# Patient Record
Sex: MALE | Race: WHITE | NOT HISPANIC OR LATINO | Employment: FULL TIME | URBAN - METROPOLITAN AREA
[De-identification: names, ages, dates, MRNs, and addresses within clinical notes are randomized per-mention and may not be internally consistent; named-entity substitution may affect disease eponyms.]

---

## 2020-02-11 ENCOUNTER — CLINICAL SUPPORT (OUTPATIENT)
Dept: URGENT CARE | Facility: CLINIC | Age: 50
End: 2020-02-11

## 2020-02-11 PROCEDURE — 99499 COAST GUARD PHYSICAL: ICD-10-PCS | Mod: S$GLB,,, | Performed by: EMERGENCY MEDICINE

## 2020-02-11 PROCEDURE — 99499 UNLISTED E&M SERVICE: CPT | Mod: S$GLB,,, | Performed by: EMERGENCY MEDICINE

## 2020-02-11 PROCEDURE — 80306 PR DOT DRUG SCREENS: ICD-10-PCS | Mod: S$GLB,,, | Performed by: EMERGENCY MEDICINE

## 2020-02-11 PROCEDURE — 80306 DRUG TEST PRSMV INSTRMNT: CPT | Mod: S$GLB,,, | Performed by: EMERGENCY MEDICINE

## 2022-09-28 ENCOUNTER — TELEPHONE (OUTPATIENT)
Dept: FAMILY MEDICINE | Facility: CLINIC | Age: 52
End: 2022-09-28

## 2022-09-28 ENCOUNTER — OFFICE VISIT (OUTPATIENT)
Dept: FAMILY MEDICINE | Facility: CLINIC | Age: 52
End: 2022-09-28
Payer: COMMERCIAL

## 2022-09-28 VITALS
HEART RATE: 66 BPM | SYSTOLIC BLOOD PRESSURE: 108 MMHG | WEIGHT: 175 LBS | BODY MASS INDEX: 26.52 KG/M2 | HEIGHT: 68 IN | DIASTOLIC BLOOD PRESSURE: 72 MMHG

## 2022-09-28 DIAGNOSIS — Z12.11 SPECIAL SCREENING FOR MALIGNANT NEOPLASM OF COLON: ICD-10-CM

## 2022-09-28 DIAGNOSIS — M16.11 ARTHRITIS OF RIGHT HIP: ICD-10-CM

## 2022-09-28 DIAGNOSIS — Z00.00 WELLNESS EXAMINATION: Primary | ICD-10-CM

## 2022-09-28 PROCEDURE — 3078F PR MOST RECENT DIASTOLIC BLOOD PRESSURE < 80 MM HG: ICD-10-PCS | Mod: CPTII,S$GLB,, | Performed by: FAMILY MEDICINE

## 2022-09-28 PROCEDURE — 3074F SYST BP LT 130 MM HG: CPT | Mod: CPTII,S$GLB,, | Performed by: FAMILY MEDICINE

## 2022-09-28 PROCEDURE — 1159F PR MEDICATION LIST DOCUMENTED IN MEDICAL RECORD: ICD-10-PCS | Mod: CPTII,S$GLB,, | Performed by: FAMILY MEDICINE

## 2022-09-28 PROCEDURE — 3008F BODY MASS INDEX DOCD: CPT | Mod: CPTII,S$GLB,, | Performed by: FAMILY MEDICINE

## 2022-09-28 PROCEDURE — 99386 PR PREVENTIVE VISIT,NEW,40-64: ICD-10-PCS | Mod: S$GLB,,, | Performed by: FAMILY MEDICINE

## 2022-09-28 PROCEDURE — 1159F MED LIST DOCD IN RCRD: CPT | Mod: CPTII,S$GLB,, | Performed by: FAMILY MEDICINE

## 2022-09-28 PROCEDURE — 99386 PREV VISIT NEW AGE 40-64: CPT | Mod: S$GLB,,, | Performed by: FAMILY MEDICINE

## 2022-09-28 PROCEDURE — 3074F PR MOST RECENT SYSTOLIC BLOOD PRESSURE < 130 MM HG: ICD-10-PCS | Mod: CPTII,S$GLB,, | Performed by: FAMILY MEDICINE

## 2022-09-28 PROCEDURE — 3078F DIAST BP <80 MM HG: CPT | Mod: CPTII,S$GLB,, | Performed by: FAMILY MEDICINE

## 2022-09-28 PROCEDURE — 3008F PR BODY MASS INDEX (BMI) DOCUMENTED: ICD-10-PCS | Mod: CPTII,S$GLB,, | Performed by: FAMILY MEDICINE

## 2022-09-28 NOTE — TELEPHONE ENCOUNTER
----- Message from Kecia Simmons sent at 9/28/2022 10:32 AM CDT -----  Message goes to Lanette Gonzalez. The patient saw Dr. Vega today for a physical. Lanette Gonzalez has the form. The form needs to be signed by Dr. Vega,the lab results attached to the form, lab results written on the form. Please call when the form is ready. Scan in the chart. Pt's # [phone]

## 2022-09-28 NOTE — PROGRESS NOTES
SUBJECTIVE:    Patient ID: Manfred Diaz is a 52 y.o. male.    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  Stays active by going to the gym 4-5 times per week.  He can do cardio and light weightlifting for up to 1 hour.  No chest pain or shortness of breath.    Nonsmoker, rarely drinks alcohol.    Father had diabetes, mother has osteoarthritis and lumbar disc disease.    Had MRI of the right hip due to hip pain.  Was diagnosed with some mild arthritis and iliacus tear.  It bothers him only in certain positions and certain exercises.  On no medication for this.    He is here for wellness exam today.    Has never had a colonoscopy.      No visits with results within 6 Month(s) from this visit.   Latest known visit with results is:   Historical on 01/23/2008   Component Date Value Ref Range Status    Strep A Culture 01/23/2008 NEGATIVE FOR GROUP A STREP   Final       History reviewed. No pertinent past medical history.  Social History     Socioeconomic History    Marital status:    Tobacco Use    Smoking status: Never    Smokeless tobacco: Never   Substance and Sexual Activity    Alcohol use: Yes     Comment: occasionally     History reviewed. No pertinent surgical history.  Family History   Problem Relation Age of Onset    Diabetes Father        Review of patient's allergies indicates:  No Known Allergies  No current outpatient medications on file.    Review of Systems   Constitutional:  Negative for appetite change, chills, fatigue, fever and unexpected weight change.   HENT:  Negative for congestion, ear pain and trouble swallowing.    Eyes:  Negative for pain, discharge and visual disturbance.   Respiratory:  Negative for apnea, cough, shortness of breath and wheezing.    Cardiovascular:  Negative for chest pain and leg swelling.  "  Gastrointestinal:  Negative for abdominal pain, blood in stool, constipation, diarrhea, nausea and vomiting.   Endocrine: Negative for cold intolerance, heat intolerance and polydipsia.   Genitourinary:  Negative for dysuria, hematuria, testicular pain and urgency.   Musculoskeletal:  Positive for arthralgias (rt hip aches occas). Negative for gait problem, joint swelling and myalgias.   Neurological:  Negative for dizziness, seizures and numbness.   Psychiatric/Behavioral:  Negative for agitation, behavioral problems, hallucinations and sleep disturbance. The patient is not nervous/anxious.         Objective:      Vitals:    09/28/22 0927   BP: 108/72   Pulse: 66   Weight: 79.4 kg (175 lb)   Height: 5' 8" (1.727 m)     Physical Exam  Vitals and nursing note reviewed.   Constitutional:       General: He is not in acute distress.     Appearance: Normal appearance. He is well-developed and normal weight. He is not toxic-appearing.   HENT:      Head: Normocephalic and atraumatic.      Right Ear: Tympanic membrane and external ear normal.      Left Ear: Tympanic membrane and external ear normal.      Nose: Nose normal.   Eyes:      Pupils: Pupils are equal, round, and reactive to light.   Neck:      Thyroid: No thyromegaly.      Vascular: No carotid bruit.   Cardiovascular:      Rate and Rhythm: Normal rate and regular rhythm.      Heart sounds: Normal heart sounds. No murmur heard.  Pulmonary:      Effort: Pulmonary effort is normal.      Breath sounds: Normal breath sounds. No wheezing or rales.   Abdominal:      General: Bowel sounds are normal. There is no distension.      Palpations: Abdomen is soft.      Tenderness: There is no abdominal tenderness.   Musculoskeletal:         General: No tenderness or deformity. Normal range of motion.      Cervical back: Normal range of motion and neck supple.      Lumbar back: Normal. No spasms.      Comments: Bends 90 degrees at  waist, shoulders have good range of motion, " knees slightly crepitant but good range of motion, no pitting edema to lower extremities   Lymphadenopathy:      Cervical: No cervical adenopathy.   Skin:     General: Skin is warm and dry.      Findings: No rash.   Neurological:      Mental Status: He is alert and oriented to person, place, and time.      Cranial Nerves: No cranial nerve deficit.      Coordination: Coordination normal.   Psychiatric:         Mood and Affect: Mood normal.         Behavior: Behavior normal.         Thought Content: Thought content normal.         Judgment: Judgment normal.         Assessment:       1. Wellness examination    2. Special screening for malignant neoplasm of colon    3. Arthritis of right hip           Plan:       Wellness examination  -     Glucose, Fasting; Future; Expected date: 09/28/2022  -     Lipid Panel; Future; Expected date: 09/28/2022  [facility] wellness program lab work ordered, appears to have a healthy lifestyle and good exercise regimen.  Special screening for malignant neoplasm of colon  -     Ambulatory referral/consult to Gastroenterology; Future; Expected date: 10/05/2022  Will refer for colonoscopy Dr. Iron wadsworth  Arthritis of right hip  Minimal symptoms at this time.  Will get further lab work including CBC CMP PSA next visit.  Follow up in about 6 months (around 3/28/2023).        9/28/2022 Yefri Vega

## 2022-09-29 LAB
CHOLEST SERPL-MCNC: 173 MG/DL
CHOLEST/HDLC SERPL: 4.4 (CALC)
GLUCOSE SERPL-MCNC: 87 MG/DL (ref 65–99)
HDLC SERPL-MCNC: 39 MG/DL
LDLC SERPL CALC-MCNC: 112 MG/DL (CALC)
NONHDLC SERPL-MCNC: 134 MG/DL (CALC)
TRIGL SERPL-MCNC: 109 MG/DL

## 2022-10-05 ENCOUNTER — TELEPHONE (OUTPATIENT)
Dept: FAMILY MEDICINE | Facility: CLINIC | Age: 52
End: 2022-10-05

## 2022-10-05 NOTE — PROGRESS NOTES
Call patient.  Cholesterol excellent at 173 triglycerides 109.  Fasting sugar 87, all tests were normal.

## 2022-10-05 NOTE — TELEPHONE ENCOUNTER
----- Message from Yefri Vega MD sent at 10/4/2022  8:50 PM CDT -----  Call patient.  Cholesterol excellent at 173 triglycerides 109.  Fasting sugar 87, all tests were normal.

## 2023-01-27 ENCOUNTER — PATIENT MESSAGE (OUTPATIENT)
Dept: FAMILY MEDICINE | Facility: CLINIC | Age: 53
End: 2023-01-27

## 2023-03-24 ENCOUNTER — PATIENT MESSAGE (OUTPATIENT)
Dept: FAMILY MEDICINE | Facility: CLINIC | Age: 53
End: 2023-03-24

## 2023-03-24 ENCOUNTER — TELEPHONE (OUTPATIENT)
Dept: FAMILY MEDICINE | Facility: CLINIC | Age: 53
End: 2023-03-24

## 2023-03-24 DIAGNOSIS — Z12.5 SPECIAL SCREENING FOR MALIGNANT NEOPLASM OF PROSTATE: ICD-10-CM

## 2023-03-24 DIAGNOSIS — Z00.00 ROUTINE GENERAL MEDICAL EXAMINATION AT A HEALTH CARE FACILITY: Primary | ICD-10-CM

## 2023-03-27 ENCOUNTER — PATIENT MESSAGE (OUTPATIENT)
Dept: FAMILY MEDICINE | Facility: CLINIC | Age: 53
End: 2023-03-27

## 2023-03-28 ENCOUNTER — OFFICE VISIT (OUTPATIENT)
Dept: FAMILY MEDICINE | Facility: CLINIC | Age: 53
End: 2023-03-28
Payer: COMMERCIAL

## 2023-03-28 VITALS
WEIGHT: 180 LBS | HEART RATE: 60 BPM | HEIGHT: 68 IN | SYSTOLIC BLOOD PRESSURE: 106 MMHG | DIASTOLIC BLOOD PRESSURE: 70 MMHG | BODY MASS INDEX: 27.28 KG/M2

## 2023-03-28 DIAGNOSIS — Z85.828 HISTORY OF BASAL CELL CARCINOMA: ICD-10-CM

## 2023-03-28 DIAGNOSIS — Z85.89 HISTORY OF SQUAMOUS CELL CARCINOMA: ICD-10-CM

## 2023-03-28 DIAGNOSIS — Z00.00 WELLNESS EXAMINATION: Primary | ICD-10-CM

## 2023-03-28 PROCEDURE — 99214 PR OFFICE/OUTPT VISIT, EST, LEVL IV, 30-39 MIN: ICD-10-PCS | Mod: S$GLB,,, | Performed by: FAMILY MEDICINE

## 2023-03-28 PROCEDURE — 1159F MED LIST DOCD IN RCRD: CPT | Mod: CPTII,S$GLB,, | Performed by: FAMILY MEDICINE

## 2023-03-28 PROCEDURE — 3008F BODY MASS INDEX DOCD: CPT | Mod: CPTII,S$GLB,, | Performed by: FAMILY MEDICINE

## 2023-03-28 PROCEDURE — 3078F PR MOST RECENT DIASTOLIC BLOOD PRESSURE < 80 MM HG: ICD-10-PCS | Mod: CPTII,S$GLB,, | Performed by: FAMILY MEDICINE

## 2023-03-28 PROCEDURE — 99214 OFFICE O/P EST MOD 30 MIN: CPT | Mod: S$GLB,,, | Performed by: FAMILY MEDICINE

## 2023-03-28 PROCEDURE — 3078F DIAST BP <80 MM HG: CPT | Mod: CPTII,S$GLB,, | Performed by: FAMILY MEDICINE

## 2023-03-28 PROCEDURE — 3074F SYST BP LT 130 MM HG: CPT | Mod: CPTII,S$GLB,, | Performed by: FAMILY MEDICINE

## 2023-03-28 PROCEDURE — 1159F PR MEDICATION LIST DOCUMENTED IN MEDICAL RECORD: ICD-10-PCS | Mod: CPTII,S$GLB,, | Performed by: FAMILY MEDICINE

## 2023-03-28 PROCEDURE — 3008F PR BODY MASS INDEX (BMI) DOCUMENTED: ICD-10-PCS | Mod: CPTII,S$GLB,, | Performed by: FAMILY MEDICINE

## 2023-03-28 PROCEDURE — 3074F PR MOST RECENT SYSTOLIC BLOOD PRESSURE < 130 MM HG: ICD-10-PCS | Mod: CPTII,S$GLB,, | Performed by: FAMILY MEDICINE

## 2023-03-28 NOTE — PROGRESS NOTES
SUBJECTIVE:    Patient ID: Manfred Diaz is a 52 y.o. male.    Chief Complaint: Follow-up (No medicine, no complaints, abc )     Manfred Diaz is a 52 y.o. male.     Chief Complaint: Follow-up (No medicine, no complaints, abc )     51 yo M coming in for follow up in clinic today. He recently had Surgery with Dr. 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 Per pt there was full excision with good margins. He has plans to follow up with Dr. Hagen for dermatology in this coming April. He reports sun exposure often as a child. He is also an avid fisherman.     He remains active, exercising between 4-5 days a week. He does 30 minutes of cardio every other day with strength training and stretching as well. He was a PT prior to moving to management.      He is not interested in the Shingrex Vaccine at this time.      Pt had colonoscopy 3/10/23 with Dr. Perdue. RTC 10 yr.      No other symptoms or complaints at this time.      Review of Systems   Constitutional:  Negative for appetite change, chills, fatigue, fever and unexpected weight change.   HENT:  Negative for nasal congestion, ear pain and trouble swallowing.    Eyes:  Negative for pain, discharge and visual disturbance.   Respiratory:  Negative for apnea, cough, shortness of breath and wheezing.    Cardiovascular:  Negative for chest pain and leg swelling.   Gastrointestinal:  Negative for abdominal pain, blood in stool, constipation, diarrhea, nausea and vomiting.   Endocrine: Negative for cold intolerance, heat intolerance and polydipsia.   Genitourinary:  Negative for dysuria, hematuria, testicular pain and urgency.   Musculoskeletal:  Negative for gait problem, joint swelling and myalgias.   Neurological:  Negative for dizziness, seizures and numbness.   Psychiatric/Behavioral:  Negative for agitation, behavioral problems and hallucinations. The patient is not nervous/anxious.       Objective      Physical Exam  Vitals and  nursing note reviewed.   Constitutional:       Appearance: He is well-developed.   HENT:      Head: Normocephalic and atraumatic.      Right Ear: External ear normal.      Left Ear: External ear normal.      Nose: Nose normal.   Eyes:      Pupils: Pupils are equal, round, and reactive to light.   Neck:      Thyroid: No thyromegaly.      Vascular: No carotid bruit.   Cardiovascular:      Rate and Rhythm: Normal rate and regular rhythm.      Heart sounds: Normal heart sounds. No murmur heard.  Pulmonary:      Effort: Pulmonary effort is normal.      Breath sounds: Normal breath sounds. No wheezing or rales.   Abdominal:      General: Bowel sounds are normal. There is no distension.      Palpations: Abdomen is soft.      Tenderness: There is no abdominal tenderness.   Musculoskeletal:         General: No tenderness or deformity. Normal range of motion.      Cervical back: Normal range of motion and neck supple.      Lumbar back: Normal. No spasms.      Comments: Bends 90 degrees at  waist   Lymphadenopathy:      Cervical: No cervical adenopathy.   Skin:     General: Skin is warm and dry.      Findings: No rash.      Comments: Well healed incision on the L neck below the mandible. Dry and intact. No sign of infection. Well healed incision on the R cheek. Dry and intact. No sign of infection   Neurological:      Mental Status: He is alert and oriented to person, place, and time.      Cranial Nerves: No cranial nerve deficit.      Coordination: Coordination normal.   Psychiatric:         Behavior: Behavior normal.         Thought Content: Thought       No visits with results within 6 Month(s) from this visit.   Latest known visit with results is:   Office Visit on 09/28/2022   Component Date Value Ref Range Status    Cholesterol 09/28/2022 173  <200 mg/dL Final    HDL 09/28/2022 39 (L)  > OR = 40 mg/dL Final    Triglycerides 09/28/2022 109  <150 mg/dL Final    LDL Cholesterol 09/28/2022 112 (H)  mg/dL (calc) Final     "HDL/Cholesterol Ratio 09/28/2022 4.4  <5.0 (calc) Final    Non HDL Chol. (LDL+VLDL) 09/28/2022 134 (H)  <130 mg/dL (calc) Final    Glucose 09/28/2022 87  65 - 99 mg/dL Final       No past medical history on file.  Social History     Socioeconomic History    Marital status:    Tobacco Use    Smoking status: Never    Smokeless tobacco: Never   Substance and Sexual Activity    Alcohol use: Yes     Comment: occasionally     No past surgical history on file.  Family History   Problem Relation Age of Onset    Diabetes Father        Review of patient's allergies indicates:  No Known Allergies  No current outpatient medications on file.    Review of Systems        Objective:      Vitals:    03/28/23 1037   BP: 106/70   Pulse: 60   Weight: 81.6 kg (180 lb)   Height: 5' 8" (1.727 m)     Physical Exam      Assessment:       1. Wellness examination    2. History of squamous cell carcinoma    3. History of basal cell carcinoma         Plan:       Wellness examination  Patient has good exercise habits  History of squamous cell carcinoma  Continue follow-up with Dermatology, he may wish to transition to [facility] dermatologist soon.  History of basal cell carcinoma  Will need wellness labs done fasting this bring    No follow-ups on file.        3/28/2023 Yefri Vega      "

## 2023-03-28 NOTE — MEDICAL/APP STUDENT
Subjective     Patient ID: Manfred Diaz is a 52 y.o. male.    Chief Complaint: Follow-up (No medicine, no complaints, abc )    53 yo M coming in for follow up in clinic today. He recently had Surgery with Dr. 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 Per pt there was full excision with good margins. He has plans to follow up with Dr. Hagen for dermatology in this coming April. He reports sun exposure often as a child. He is also an avid fisherman.    He remains active, exercising between 4-5 days a week. He does 30 minutes of cardio every other day with strength training and stretching as well. He was a PT prior to moving to management.     He is not interested in the Shingrex Vaccine at this time.     Pt had colonoscopy 3/10/23 with Dr. Perdue. RTC 10 yr.     No other symptoms or complaints at this time.     Review of Systems   Constitutional:  Negative for appetite change, chills, fatigue, fever and unexpected weight change.   HENT:  Negative for nasal congestion, ear pain and trouble swallowing.    Eyes:  Negative for pain, discharge and visual disturbance.   Respiratory:  Negative for apnea, cough, shortness of breath and wheezing.    Cardiovascular:  Negative for chest pain and leg swelling.   Gastrointestinal:  Negative for abdominal pain, blood in stool, constipation, diarrhea, nausea and vomiting.   Endocrine: Negative for cold intolerance, heat intolerance and polydipsia.   Genitourinary:  Negative for dysuria, hematuria, testicular pain and urgency.   Musculoskeletal:  Negative for gait problem, joint swelling and myalgias.   Neurological:  Negative for dizziness, seizures and numbness.   Psychiatric/Behavioral:  Negative for agitation, behavioral problems and hallucinations. The patient is not nervous/anxious.         Objective     Physical Exam  Vitals and nursing note reviewed.   Constitutional:       Appearance: He is well-developed.   HENT:      Head: Normocephalic  and atraumatic.      Right Ear: External ear normal.      Left Ear: External ear normal.      Nose: Nose normal.   Eyes:      Pupils: Pupils are equal, round, and reactive to light.   Neck:      Thyroid: No thyromegaly.      Vascular: No carotid bruit.   Cardiovascular:      Rate and Rhythm: Normal rate and regular rhythm.      Heart sounds: Normal heart sounds. No murmur heard.  Pulmonary:      Effort: Pulmonary effort is normal.      Breath sounds: Normal breath sounds. No wheezing or rales.   Abdominal:      General: Bowel sounds are normal. There is no distension.      Palpations: Abdomen is soft.      Tenderness: There is no abdominal tenderness.   Musculoskeletal:         General: No tenderness or deformity. Normal range of motion.      Cervical back: Normal range of motion and neck supple.      Lumbar back: Normal. No spasms.      Comments: Bends 90 degrees at  waist   Lymphadenopathy:      Cervical: No cervical adenopathy.   Skin:     General: Skin is warm and dry.      Findings: No rash.      Comments: Well healed incision on the L neck below the mandible. Dry and intact. No sign of infection. Well healed incision on the R cheek. Dry and intact. No sign of infection   Neurological:      Mental Status: He is alert and oriented to person, place, and time.      Cranial Nerves: No cranial nerve deficit.      Coordination: Coordination normal.   Psychiatric:         Behavior: Behavior normal.         Thought Content: Thought content normal.         Judgment: Judgment normal.          Assessment and Plan     Problem List Items Addressed This Visit    None

## 2023-03-29 LAB
ALBUMIN SERPL-MCNC: 4.4 G/DL (ref 3.6–5.1)
ALBUMIN/GLOB SERPL: 1.8 (CALC) (ref 1–2.5)
ALP SERPL-CCNC: 77 U/L (ref 35–144)
ALT SERPL-CCNC: 68 U/L (ref 9–46)
APPEARANCE UR: CLEAR
AST SERPL-CCNC: 37 U/L (ref 10–35)
BACTERIA #/AREA URNS HPF: NORMAL /HPF
BACTERIA UR CULT: NORMAL
BASOPHILS # BLD AUTO: 19 CELLS/UL (ref 0–200)
BASOPHILS NFR BLD AUTO: 0.3 %
BILIRUB SERPL-MCNC: 0.7 MG/DL (ref 0.2–1.2)
BILIRUB UR QL STRIP: NEGATIVE
BUN SERPL-MCNC: 18 MG/DL (ref 7–25)
BUN/CREAT SERPL: ABNORMAL (CALC) (ref 6–22)
CALCIUM SERPL-MCNC: 9.4 MG/DL (ref 8.6–10.3)
CHLORIDE SERPL-SCNC: 106 MMOL/L (ref 98–110)
CHOLEST SERPL-MCNC: 173 MG/DL
CHOLEST/HDLC SERPL: 4.3 (CALC)
CO2 SERPL-SCNC: 29 MMOL/L (ref 20–32)
COLOR UR: YELLOW
CREAT SERPL-MCNC: 0.98 MG/DL (ref 0.7–1.3)
EGFR: 94 ML/MIN/1.73M2
EOSINOPHIL # BLD AUTO: 120 CELLS/UL (ref 15–500)
EOSINOPHIL NFR BLD AUTO: 1.9 %
ERYTHROCYTE [DISTWIDTH] IN BLOOD BY AUTOMATED COUNT: 12.6 % (ref 11–15)
GLOBULIN SER CALC-MCNC: 2.4 G/DL (CALC) (ref 1.9–3.7)
GLUCOSE SERPL-MCNC: 95 MG/DL (ref 65–99)
GLUCOSE UR QL STRIP: NEGATIVE
HCT VFR BLD AUTO: 44.5 % (ref 38.5–50)
HDLC SERPL-MCNC: 40 MG/DL
HGB BLD-MCNC: 15 G/DL (ref 13.2–17.1)
HGB UR QL STRIP: NEGATIVE
HYALINE CASTS #/AREA URNS LPF: NORMAL /LPF
KETONES UR QL STRIP: NEGATIVE
LDLC SERPL CALC-MCNC: 109 MG/DL (CALC)
LEUKOCYTE ESTERASE UR QL STRIP: NEGATIVE
LYMPHOCYTES # BLD AUTO: 1682 CELLS/UL (ref 850–3900)
LYMPHOCYTES NFR BLD AUTO: 26.7 %
MCH RBC QN AUTO: 29.6 PG (ref 27–33)
MCHC RBC AUTO-ENTMCNC: 33.7 G/DL (ref 32–36)
MCV RBC AUTO: 87.9 FL (ref 80–100)
MONOCYTES # BLD AUTO: 410 CELLS/UL (ref 200–950)
MONOCYTES NFR BLD AUTO: 6.5 %
NEUTROPHILS # BLD AUTO: 4070 CELLS/UL (ref 1500–7800)
NEUTROPHILS NFR BLD AUTO: 64.6 %
NITRITE UR QL STRIP: NEGATIVE
NONHDLC SERPL-MCNC: 133 MG/DL (CALC)
PH UR STRIP: 6.5 [PH] (ref 5–8)
PLATELET # BLD AUTO: 271 THOUSAND/UL (ref 140–400)
PMV BLD REES-ECKER: 9.9 FL (ref 7.5–12.5)
POTASSIUM SERPL-SCNC: 4.3 MMOL/L (ref 3.5–5.3)
PROT SERPL-MCNC: 6.8 G/DL (ref 6.1–8.1)
PROT UR QL STRIP: NEGATIVE
PSA SERPL-MCNC: 0.35 NG/ML
RBC # BLD AUTO: 5.06 MILLION/UL (ref 4.2–5.8)
RBC #/AREA URNS HPF: NORMAL /HPF
SERVICE CMNT-IMP: NORMAL
SODIUM SERPL-SCNC: 142 MMOL/L (ref 135–146)
SP GR UR STRIP: 1.01 (ref 1–1.03)
SQUAMOUS #/AREA URNS HPF: NORMAL /HPF
TRIGL SERPL-MCNC: 125 MG/DL
TSH SERPL-ACNC: 1.46 MIU/L (ref 0.4–4.5)
WBC # BLD AUTO: 6.3 THOUSAND/UL (ref 3.8–10.8)
WBC #/AREA URNS HPF: NORMAL /HPF

## 2023-04-01 ENCOUNTER — PATIENT MESSAGE (OUTPATIENT)
Dept: FAMILY MEDICINE | Facility: CLINIC | Age: 53
End: 2023-04-01

## 2023-04-01 NOTE — PROGRESS NOTES
Call patient.  He has mildly elevated liver enzymes AST 37 ALT 68.  Cholesterol excellent at 173 triglycerides 125.  Prostate normal with a PSA of 0.35 CBC shows no anemia.  Reduce any alcohol you may drink, also be careful with vitamins and supplements that may stress  the liver.  Repeat the CMP in 6 months to recheck liver enzymes

## 2023-04-03 ENCOUNTER — TELEPHONE (OUTPATIENT)
Dept: FAMILY MEDICINE | Facility: CLINIC | Age: 53
End: 2023-04-03

## 2023-04-03 NOTE — TELEPHONE ENCOUNTER
None of the over-the-counter supplements could be harmful for the liver long-term.  Possibly ivermectin and antibiotic.  Be advised that these liver numbers are not anything scary elevated.  Very mild elevations.  I agree with Dr. Vega that we recheck in about 6 months.  It is likely that they will have returned to normal.

## 2023-04-03 NOTE — TELEPHONE ENCOUNTER
----- Message from Yefri Vega MD sent at 4/1/2023 11:32 AM CDT -----  Call patient.  He has mildly elevated liver enzymes AST 37 ALT 68.  Cholesterol excellent at 173 triglycerides 125.  Prostate normal with a PSA of 0.35 CBC shows no anemia.  Reduce any alcohol you may drink, also be careful with vitamins and supplements that may stress  the liver.  Repeat the CMP in 6 months to recheck liver enzymes

## 2023-04-03 NOTE — TELEPHONE ENCOUNTER
----- Message from Alena Espinoza LPN sent at 4/3/2023  3:54 PM CDT -----  Regarding: FW: return call    ----- Message -----  From: Tarun Irby MA  Sent: 4/3/2023   3:53 PM CDT  To: Babita Mulligan Staff  Subject: return call                                      Pt returning amy's call regarding lab results. [phone]

## 2023-04-03 NOTE — TELEPHONE ENCOUNTER
Left message on voice mail for the pt to call back in regards to recent lab results.  Please see other telephone encounter for today.

## 2023-04-04 NOTE — TELEPHONE ENCOUNTER
Spoke with pt in regards to recent lab results. Verbalized per Dr. Vega that pt's mildly elevated liver enzymes AST 37 ALT 68. Cholesterol excellent at 173 triglycerides 125. Prostate normal with a PSA of 0.35 CBC shows no anemia. Reduce any alcohol you may drink, also be careful with vitamins and supplements that may stress  the liver. Repeat the CMP in 6 months to recheck liver enzymes.     Reminder set up.

## 2023-09-25 ENCOUNTER — TELEPHONE (OUTPATIENT)
Dept: FAMILY MEDICINE | Facility: CLINIC | Age: 53
End: 2023-09-25

## 2023-09-25 DIAGNOSIS — Z00.00 ROUTINE GENERAL MEDICAL EXAMINATION AT A HEALTH CARE FACILITY: Primary | ICD-10-CM

## 2023-09-25 DIAGNOSIS — Z79.899 ENCOUNTER FOR LONG-TERM (CURRENT) USE OF OTHER MEDICATIONS: ICD-10-CM

## 2023-09-25 NOTE — TELEPHONE ENCOUNTER
----- Message from Colette Casey sent at 9/25/2023  2:25 PM CDT -----  Pt is calling to reschedule his appt with dr. Vega as he has to go out of town for work. His is back on the 5th. He needs a lipid and a fasting glucose only for his work. He has a form. Has to have it done by the end of the year.   [phone]

## 2023-10-12 ENCOUNTER — OFFICE VISIT (OUTPATIENT)
Dept: FAMILY MEDICINE | Facility: CLINIC | Age: 53
End: 2023-10-12
Attending: FAMILY MEDICINE
Payer: COMMERCIAL

## 2023-10-12 VITALS
SYSTOLIC BLOOD PRESSURE: 110 MMHG | HEIGHT: 68 IN | HEART RATE: 60 BPM | BODY MASS INDEX: 27.13 KG/M2 | DIASTOLIC BLOOD PRESSURE: 80 MMHG | WEIGHT: 179 LBS

## 2023-10-12 DIAGNOSIS — Z00.00 WELLNESS EXAMINATION: Primary | ICD-10-CM

## 2023-10-12 DIAGNOSIS — Z85.828 HISTORY OF BASAL CELL CARCINOMA: ICD-10-CM

## 2023-10-12 DIAGNOSIS — R74.8 ELEVATED LIVER ENZYMES: ICD-10-CM

## 2023-10-12 DIAGNOSIS — Z85.89 HISTORY OF SQUAMOUS CELL CARCINOMA: ICD-10-CM

## 2023-10-12 PROCEDURE — 1159F MED LIST DOCD IN RCRD: CPT | Mod: CPTII,S$GLB,, | Performed by: FAMILY MEDICINE

## 2023-10-12 PROCEDURE — 3074F SYST BP LT 130 MM HG: CPT | Mod: CPTII,S$GLB,, | Performed by: FAMILY MEDICINE

## 2023-10-12 PROCEDURE — 3079F PR MOST RECENT DIASTOLIC BLOOD PRESSURE 80-89 MM HG: ICD-10-PCS | Mod: CPTII,S$GLB,, | Performed by: FAMILY MEDICINE

## 2023-10-12 PROCEDURE — 99396 PREV VISIT EST AGE 40-64: CPT | Mod: S$GLB,,, | Performed by: FAMILY MEDICINE

## 2023-10-12 PROCEDURE — 3079F DIAST BP 80-89 MM HG: CPT | Mod: CPTII,S$GLB,, | Performed by: FAMILY MEDICINE

## 2023-10-12 PROCEDURE — 3074F PR MOST RECENT SYSTOLIC BLOOD PRESSURE < 130 MM HG: ICD-10-PCS | Mod: CPTII,S$GLB,, | Performed by: FAMILY MEDICINE

## 2023-10-12 PROCEDURE — 1159F PR MEDICATION LIST DOCUMENTED IN MEDICAL RECORD: ICD-10-PCS | Mod: CPTII,S$GLB,, | Performed by: FAMILY MEDICINE

## 2023-10-12 PROCEDURE — 3008F PR BODY MASS INDEX (BMI) DOCUMENTED: ICD-10-PCS | Mod: CPTII,S$GLB,, | Performed by: FAMILY MEDICINE

## 2023-10-12 PROCEDURE — 99396 PR PREVENTIVE VISIT,EST,40-64: ICD-10-PCS | Mod: S$GLB,,, | Performed by: FAMILY MEDICINE

## 2023-10-12 PROCEDURE — 3008F BODY MASS INDEX DOCD: CPT | Mod: CPTII,S$GLB,, | Performed by: FAMILY MEDICINE

## 2023-10-12 NOTE — PROGRESS NOTES
SUBJECTIVE:    Patient ID: Manfred Diaz is a 53 y.o. male.    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  He enjoys exercising at the gym 3-4 days a week.  Weightlifting and small amount of cardio exercises.  No chest pain or shortness a breath with exercise.    He is a nonsmoker and occasionally drinks alcohol on the weekend.  He enjoys fishing in his spare time.    History of basal cell carcinoma and squamous cell carcinoma removed from his face and neck by [facility] surgeon Dr. Craven, dermatologist is Dr. Hagen.    2023-had colonoscopy with Dr. Perdue-RTC 10 years    Last year had mildly elevated liver enzymes        No visits with results within 6 Month(s) from this visit.   Latest known visit with results is:   Telephone on 03/24/2023   Component Date Value Ref Range Status    WBC 03/28/2023 6.3  3.8 - 10.8 Thousand/uL Final    RBC 03/28/2023 5.06  4.20 - 5.80 Million/uL Final    Hemoglobin 03/28/2023 15.0  13.2 - 17.1 g/dL Final    Hematocrit 03/28/2023 44.5  38.5 - 50.0 % Final    MCV 03/28/2023 87.9  80.0 - 100.0 fL Final    MCH 03/28/2023 29.6  27.0 - 33.0 pg Final    MCHC 03/28/2023 33.7  32.0 - 36.0 g/dL Final    RDW 03/28/2023 12.6  11.0 - 15.0 % Final    Platelets 03/28/2023 271  140 - 400 Thousand/uL Final    MPV 03/28/2023 9.9  7.5 - 12.5 fL Final    Neutrophils, Abs 03/28/2023 4,070  1,500 - 7,800 cells/uL Final    Lymph # 03/28/2023 1,682  850 - 3,900 cells/uL Final    Mono # 03/28/2023 410  200 - 950 cells/uL Final    Eos # 03/28/2023 120  15 - 500 cells/uL Final    Baso # 03/28/2023 19  0 - 200 cells/uL Final    Neutrophils Relative 03/28/2023 64.6  % Final    Lymph % 03/28/2023 26.7  % Final    Mono % 03/28/2023 6.5  % Final    Eosinophil % 03/28/2023 1.9  % Final    Basophil % 03/28/2023 0.3  % Final    Glucose 03/28/2023 95  65 - 99 mg/dL  Final    BUN 03/28/2023 18  7 - 25 mg/dL Final    Creatinine 03/28/2023 0.98  0.70 - 1.30 mg/dL Final    eGFR 03/28/2023 94  > OR = 60 mL/min/1.73m2 Final    BUN/Creatinine Ratio 03/28/2023 NOT APPLICABLE  6 - 22 (calc) Final    Sodium 03/28/2023 142  135 - 146 mmol/L Final    Potassium 03/28/2023 4.3  3.5 - 5.3 mmol/L Final    Chloride 03/28/2023 106  98 - 110 mmol/L Final    CO2 03/28/2023 29  20 - 32 mmol/L Final    Calcium 03/28/2023 9.4  8.6 - 10.3 mg/dL Final    Total Protein 03/28/2023 6.8  6.1 - 8.1 g/dL Final    Albumin 03/28/2023 4.4  3.6 - 5.1 g/dL Final    Globulin, Total 03/28/2023 2.4  1.9 - 3.7 g/dL (calc) Final    Albumin/Globulin Ratio 03/28/2023 1.8  1.0 - 2.5 (calc) Final    Total Bilirubin 03/28/2023 0.7  0.2 - 1.2 mg/dL Final    Alkaline Phosphatase 03/28/2023 77  35 - 144 U/L Final    AST 03/28/2023 37 (H)  10 - 35 U/L Final    ALT 03/28/2023 68 (H)  9 - 46 U/L Final    Cholesterol 03/28/2023 173  <200 mg/dL Final    HDL 03/28/2023 40  > OR = 40 mg/dL Final    Triglycerides 03/28/2023 125  <150 mg/dL Final    LDL Cholesterol 03/28/2023 109 (H)  mg/dL (calc) Final    HDL/Cholesterol Ratio 03/28/2023 4.3  <5.0 (calc) Final    Non HDL Chol. (LDL+VLDL) 03/28/2023 133 (H)  <130 mg/dL (calc) Final    Color, UA 03/28/2023 YELLOW  YELLOW Final    Appearance, UA 03/28/2023 CLEAR  CLEAR Final    Specific Gravity, UA 03/28/2023 1.015  1.001 - 1.035 Final    pH, UA 03/28/2023 6.5  5.0 - 8.0 Final    Glucose, UA 03/28/2023 NEGATIVE  NEGATIVE Final    Bilirubin, UA 03/28/2023 NEGATIVE  NEGATIVE Final    Ketones, UA 03/28/2023 NEGATIVE  NEGATIVE Final    Occult Blood UA 03/28/2023 NEGATIVE  NEGATIVE Final    Protein, UA 03/28/2023 NEGATIVE  NEGATIVE Final    Nitrite, UA 03/28/2023 NEGATIVE  NEGATIVE Final    Leukocytes, UA 03/28/2023 NEGATIVE  NEGATIVE Final    WBC Casts, UA 03/28/2023 NONE SEEN  < OR = 5 /HPF Final    RBC Casts, UA 03/28/2023 NONE SEEN  < OR = 2 /HPF Final    Squam Epithel, UA 03/28/2023  NONE SEEN  < OR = 5 /HPF Final    Bacteria, UA 03/28/2023 NONE SEEN  NONE SEEN /HPF Final    Hyaline Casts, UA 03/28/2023 NONE SEEN  NONE SEEN /LPF Final    Service Cmt: 03/28/2023    Final    Reflexive Urine Culture 03/28/2023    Final    TSH w/reflex to FT4 03/28/2023 1.46  0.40 - 4.50 mIU/L Final    PROSTATE SPECIFIC ANTIGEN, SCR - Q* 03/28/2023 0.35  < OR = 4.00 ng/mL Final       No past medical history on file.  Social History     Socioeconomic History    Marital status:    Tobacco Use    Smoking status: Never    Smokeless tobacco: Never   Substance and Sexual Activity    Alcohol use: Yes     Comment: occasionally     No past surgical history on file.  Family History   Problem Relation Age of Onset    Diabetes Father        The 10-year CVD risk score (Wally, et al., 2008) is: 6.8%    Values used to calculate the score:      Age: 53 years      Sex: Male      Diabetic: No      Tobacco smoker: No      Systolic Blood Pressure: 110 mmHg      Is BP treated: No      HDL Cholesterol: 40 mg/dL      Total Cholesterol: 173 mg/dL    All of your core healthy metrics are met.      Review of patient's allergies indicates:  No Known Allergies  No current outpatient medications on file.    Review of Systems   Constitutional:  Negative for appetite change, chills, fatigue, fever and unexpected weight change.   HENT:  Negative for ear pain and trouble swallowing.    Eyes:  Negative for pain, discharge and visual disturbance.   Respiratory:  Negative for apnea, cough, shortness of breath and wheezing.    Cardiovascular:  Negative for chest pain and leg swelling.   Gastrointestinal:  Negative for abdominal pain, blood in stool, constipation, diarrhea, nausea, vomiting and reflux.   Endocrine: Negative for cold intolerance, heat intolerance and polydipsia.   Genitourinary:  Negative for bladder incontinence, dysuria, erectile dysfunction, frequency, hematuria, testicular pain and urgency.   Musculoskeletal:  Negative for  "gait problem, joint swelling and myalgias.   Neurological:  Negative for dizziness, seizures and numbness.   Psychiatric/Behavioral:  Negative for agitation, behavioral problems, hallucinations and sleep disturbance. The patient is not nervous/anxious.            Objective:      Vitals:    10/12/23 0818   BP: 110/80   Pulse: 60   Weight: 81.2 kg (179 lb)   Height: 5' 8" (1.727 m)     Physical Exam  Vitals and nursing note reviewed.   Constitutional:       General: He is not in acute distress.     Appearance: Normal appearance. He is well-developed and normal weight. He is not toxic-appearing.   HENT:      Head: Normocephalic and atraumatic.      Right Ear: Tympanic membrane and external ear normal.      Left Ear: Tympanic membrane and external ear normal.      Nose: Nose normal.      Mouth/Throat:      Pharynx: Oropharynx is clear.   Eyes:      Pupils: Pupils are equal, round, and reactive to light.   Neck:      Thyroid: No thyromegaly.      Vascular: No carotid bruit.   Cardiovascular:      Rate and Rhythm: Normal rate and regular rhythm.      Heart sounds: Normal heart sounds. No murmur heard.  Pulmonary:      Effort: Pulmonary effort is normal.      Breath sounds: Normal breath sounds. No wheezing or rales.   Abdominal:      General: Bowel sounds are normal. There is no distension.      Palpations: Abdomen is soft.      Tenderness: There is no abdominal tenderness.   Musculoskeletal:         General: No tenderness or deformity. Normal range of motion.      Cervical back: Normal range of motion and neck supple.      Lumbar back: Normal. No spasms.      Comments: Bends 90 degrees at  waist shoulders and knees good range of motion no pitting edema to lower extremities   Lymphadenopathy:      Cervical: No cervical adenopathy.   Skin:     General: Skin is warm and dry.      Findings: No rash.   Neurological:      Mental Status: He is alert and oriented to person, place, and time. Mental status is at baseline.      " Cranial Nerves: No cranial nerve deficit.      Coordination: Coordination normal.      Gait: Gait normal.   Psychiatric:         Mood and Affect: Mood normal.         Behavior: Behavior normal.         Thought Content: Thought content normal.         Judgment: Judgment normal.           Assessment:       1. Wellness examination    2. History of basal cell carcinoma    3. History of squamous cell carcinoma    4. Elevated liver enzymes         Plan:       Wellness examination  -     CBC Auto Differential; Future; Expected date: 10/12/2023  -     Comprehensive Metabolic Panel; Future; Expected date: 10/12/2023  -     Lipid Panel; Future; Expected date: 10/12/2023  -     Urinalysis, Reflex to Urine Culture Urine, Clean Catch; Future; Expected date: 10/12/2023  -     PSA, Screening; Future; Expected date: 10/12/2023  Patient appears have very healthy lifestyle.  He takes no prescription medication.  Routine wellness labs have been ordered.  History of basal cell carcinoma  Follows up with Dr. Hagen  History of squamous cell carcinoma    Elevated liver enzymes  Recheck CMP    No follow-ups on file.        10/12/2023 Yefri Vega

## 2023-10-13 LAB
ALBUMIN SERPL-MCNC: 4.4 G/DL (ref 3.6–5.1)
ALBUMIN/GLOB SERPL: 1.8 (CALC) (ref 1–2.5)
ALP SERPL-CCNC: 83 U/L (ref 35–144)
ALT SERPL-CCNC: 53 U/L (ref 9–46)
APPEARANCE UR: CLEAR
AST SERPL-CCNC: 37 U/L (ref 10–35)
BACTERIA #/AREA URNS HPF: NORMAL /HPF
BACTERIA UR CULT: NORMAL
BASOPHILS # BLD AUTO: 19 CELLS/UL (ref 0–200)
BASOPHILS NFR BLD AUTO: 0.3 %
BILIRUB SERPL-MCNC: 0.8 MG/DL (ref 0.2–1.2)
BILIRUB UR QL STRIP: NEGATIVE
BUN SERPL-MCNC: 17 MG/DL (ref 7–25)
BUN/CREAT SERPL: ABNORMAL (CALC) (ref 6–22)
CALCIUM SERPL-MCNC: 9.4 MG/DL (ref 8.6–10.3)
CHLORIDE SERPL-SCNC: 105 MMOL/L (ref 98–110)
CHOLEST SERPL-MCNC: 185 MG/DL
CHOLEST/HDLC SERPL: 5 (CALC)
CO2 SERPL-SCNC: 28 MMOL/L (ref 20–32)
COLOR UR: YELLOW
CREAT SERPL-MCNC: 1.05 MG/DL (ref 0.7–1.3)
EGFR: 86 ML/MIN/1.73M2
EOSINOPHIL # BLD AUTO: 221 CELLS/UL (ref 15–500)
EOSINOPHIL NFR BLD AUTO: 3.5 %
ERYTHROCYTE [DISTWIDTH] IN BLOOD BY AUTOMATED COUNT: 12.5 % (ref 11–15)
GLOBULIN SER CALC-MCNC: 2.5 G/DL (CALC) (ref 1.9–3.7)
GLUCOSE SERPL-MCNC: 95 MG/DL (ref 65–99)
GLUCOSE UR QL STRIP: NEGATIVE
HCT VFR BLD AUTO: 45 % (ref 38.5–50)
HDLC SERPL-MCNC: 37 MG/DL
HGB BLD-MCNC: 15.6 G/DL (ref 13.2–17.1)
HGB UR QL STRIP: NEGATIVE
HYALINE CASTS #/AREA URNS LPF: NORMAL /LPF
KETONES UR QL STRIP: NEGATIVE
LDLC SERPL CALC-MCNC: 120 MG/DL (CALC)
LEUKOCYTE ESTERASE UR QL STRIP: NEGATIVE
LYMPHOCYTES # BLD AUTO: 1638 CELLS/UL (ref 850–3900)
LYMPHOCYTES NFR BLD AUTO: 26 %
MCH RBC QN AUTO: 30.7 PG (ref 27–33)
MCHC RBC AUTO-ENTMCNC: 34.7 G/DL (ref 32–36)
MCV RBC AUTO: 88.6 FL (ref 80–100)
MONOCYTES # BLD AUTO: 441 CELLS/UL (ref 200–950)
MONOCYTES NFR BLD AUTO: 7 %
NEUTROPHILS # BLD AUTO: 3982 CELLS/UL (ref 1500–7800)
NEUTROPHILS NFR BLD AUTO: 63.2 %
NITRITE UR QL STRIP: NEGATIVE
NONHDLC SERPL-MCNC: 148 MG/DL (CALC)
PH UR STRIP: 7 [PH] (ref 5–8)
PLATELET # BLD AUTO: 269 THOUSAND/UL (ref 140–400)
PMV BLD REES-ECKER: 9.9 FL (ref 7.5–12.5)
POTASSIUM SERPL-SCNC: 4.8 MMOL/L (ref 3.5–5.3)
PROT SERPL-MCNC: 6.9 G/DL (ref 6.1–8.1)
PROT UR QL STRIP: NEGATIVE
PSA SERPL-MCNC: 0.37 NG/ML
RBC # BLD AUTO: 5.08 MILLION/UL (ref 4.2–5.8)
RBC #/AREA URNS HPF: NORMAL /HPF
SERVICE CMNT-IMP: NORMAL
SODIUM SERPL-SCNC: 140 MMOL/L (ref 135–146)
SP GR UR STRIP: 1.01 (ref 1–1.03)
SQUAMOUS #/AREA URNS HPF: NORMAL /HPF
TRIGL SERPL-MCNC: 160 MG/DL
WBC # BLD AUTO: 6.3 THOUSAND/UL (ref 3.8–10.8)
WBC #/AREA URNS HPF: NORMAL /HPF

## 2023-10-15 NOTE — PROGRESS NOTES
Call patient.  Sugar level and kidneys are normal.  Liver enzymes remains slightly elevated.  Cholesterol 185 triglycerides minor elevation 160.  CBC shows no anemia.  Prostate normal with a PSA of 0.37.  Recommend repeating CMP in 6 months to recheck liver enzymes.

## 2023-10-16 ENCOUNTER — TELEPHONE (OUTPATIENT)
Dept: FAMILY MEDICINE | Facility: CLINIC | Age: 53
End: 2023-10-16

## 2023-10-16 NOTE — TELEPHONE ENCOUNTER
"Per Dr. Vega "some people take herbal supplements like milk thistle to help the liver. Is no improvement in liver enzymes in 6 mo we will do hepatic panel.:   "

## 2023-10-16 NOTE — TELEPHONE ENCOUNTER
Spoke to patient with results verbatim per Dr Vega. Verbalized understanding on all. Remind me for lab. Wants to know what he can do about his liver enzymes. Said he doesn't drink much alcohol at all.

## 2023-10-16 NOTE — TELEPHONE ENCOUNTER
----- Message from Yefri Vega MD sent at 10/14/2023  7:03 PM CDT -----  Call patient.  Sugar level and kidneys are normal.  Liver enzymes remains slightly elevated.  Cholesterol 185 triglycerides minor elevation 160.  CBC shows no anemia.  Prostate normal with a PSA of 0.37.  Recommend repeating CMP in 6 months to recheck liver enzymes.

## 2023-10-17 RX ORDER — ASCORBIC ACID 1000 MG
TABLET ORAL DAILY
COMMUNITY

## 2023-10-17 NOTE — TELEPHONE ENCOUNTER
Spoke to patient with Dr Vega's recommendation of trying milk thistle, added to med list, and if no improvement in 6 months will proceed with further testing. Verbalized understanding.

## 2023-10-18 ENCOUNTER — TELEPHONE (OUTPATIENT)
Dept: FAMILY MEDICINE | Facility: CLINIC | Age: 53
End: 2023-10-18

## 2023-10-25 ENCOUNTER — TELEPHONE (OUTPATIENT)
Dept: FAMILY MEDICINE | Facility: CLINIC | Age: 53
End: 2023-10-25

## 2023-10-25 NOTE — TELEPHONE ENCOUNTER
Spoke to patient. Advised him that if he gets a bill from [employer] to contact me so that I can ask [facility] to right it off as he shouldn't have had wellness labs drawn again. Also, advised patient to keep his wellness visit in October from now on. He has ov 10/24/24. I also removed lipid panel from [facility] as this was drawn and advised patient to make sure he lets the lab know he only needs fasting glucose when he comes back in. Said he will see if insurance will take fasting glucose from [facility], but if not will come for lab. To Dr Vega as there are also 2 wellness visits for patient, October and March. Not sure how to handle that end with billing

## 2023-10-25 NOTE — TELEPHONE ENCOUNTER
----- Message from Colette Casey sent at 10/25/2023  3:53 PM CDT -----  Pt came in to do his physical on 10/12, we ordered a full panel of labs that he had done that same day. He had those labs already done in march. He only needed the fasting glucose and lipids done per his insurance and we did the whole panel including a psa. I asked the patient once he gets a bill from [employer] to bring it in so we can get that taken care of. He is going to wait till he gets his eob from his insurance about his visit and then will let us know if that is not covered at 100%

## 2023-11-07 ENCOUNTER — TELEPHONE (OUTPATIENT)
Dept: FAMILY MEDICINE | Facility: CLINIC | Age: 53
End: 2023-11-07

## 2023-11-07 NOTE — TELEPHONE ENCOUNTER
[facility] requesting additional code for PSA and u/a. Added Z79.899, Z12.5, and Z13.89  to billing trailer. We did anticipate insurance not covering some labs. See 10/25/23 encounter.

## 2023-11-09 ENCOUNTER — TELEPHONE (OUTPATIENT)
Dept: FAMILY MEDICINE | Facility: CLINIC | Age: 53
End: 2023-11-09

## 2023-11-09 NOTE — TELEPHONE ENCOUNTER
Patient called back and informed me that his HSA wasn't actually charged, but he got more of an EOB that he may owe this amount and he can pay it from there if need be. I emailed Ray with this information, asked that patient not be charged for these labs, created reminder for next week to contact patient if I haven't heard back from ray yet.

## 2023-11-09 NOTE — TELEPHONE ENCOUNTER
Spoke to pt, said he checked his health savings account, he saw 5 charges from [facility] for a little over 500.00. I emailed Ray with [employer] asking him to 1) look into how [facility] charged the patient's HSA account 2) if [employer] would please remove those charges and 3) will [facility] please write off these amounts. Patient has not received a bill yet. Patient is going to call his HSA to ask them not to put these charges through. He will touch base with me on that.

## 2023-11-09 NOTE — TELEPHONE ENCOUNTER
----- Message from Babita Bills sent at 11/9/2023  9:12 AM CST -----  Pt needs to discuss [facility] lab charges. Pt #[phone]

## 2023-11-10 NOTE — TELEPHONE ENCOUNTER
I heard back from Ray. He is going to put in for a credit due to labs being drawn in error. Advised patient of this and if, by some chance, he gets a bill to contact our office. Verbalized understanding

## 2024-04-02 ENCOUNTER — TELEPHONE (OUTPATIENT)
Dept: FAMILY MEDICINE | Facility: CLINIC | Age: 54
End: 2024-04-02
Payer: COMMERCIAL

## 2024-04-02 DIAGNOSIS — R74.8 ELEVATED LIVER ENZYMES: Primary | ICD-10-CM

## 2024-04-02 DIAGNOSIS — Z79.899 ENCOUNTER FOR LONG-TERM (CURRENT) USE OF OTHER MEDICATIONS: ICD-10-CM

## 2024-04-02 NOTE — TELEPHONE ENCOUNTER
----- [facility] sent at 10/16/2023 11:59 AM CDT -----  Regarding: lab due  Message from Yefri Vega MD sent at 10/14/2023  7:03 PM CDT -----  Call patient.  Sugar level and kidneys are normal.  Liver enzymes remains slightly elevated.  Cholesterol 185 triglycerides minor elevation 160.  CBC shows no anemia.  Prostate normal with a PSA of 0.37.  Recommend repeating CMP in 6 months to recheck liver enzymes.

## 2024-04-17 ENCOUNTER — TELEPHONE (OUTPATIENT)
Dept: FAMILY MEDICINE | Facility: CLINIC | Age: 54
End: 2024-04-17
Payer: COMMERCIAL

## 2024-04-17 NOTE — TELEPHONE ENCOUNTER
----- Message from Clementina Bower LPN sent at 4/17/2024  7:52 AM CDT -----  Regarding: FW: lab due    ----- Message -----  From: Cora Zambrano RT  Sent: 4/2/2024  12:00 AM CDT  To: RT Mary  Subject: lab due                                          Message from Yefri Vega MD sent at 10/14/2023  7:03 PM CDT -----  Call patient.  Sugar level and kidneys are normal.  Liver enzymes remains slightly elevated.  Cholesterol 185 triglycerides minor elevation 160.  CBC shows no anemia.  Prostate normal with a PSA of 0.37.  Recommend repeating CMP in 6 months to recheck liver enzymes.

## 2024-04-26 ENCOUNTER — TELEPHONE (OUTPATIENT)
Dept: FAMILY MEDICINE | Facility: CLINIC | Age: 54
End: 2024-04-26
Payer: COMMERCIAL

## 2024-04-26 DIAGNOSIS — R74.8 ELEVATED LIVER ENZYMES: Primary | ICD-10-CM

## 2024-04-26 DIAGNOSIS — Z79.899 ENCOUNTER FOR LONG-TERM (CURRENT) USE OF OTHER MEDICATIONS: ICD-10-CM

## 2024-05-02 NOTE — TELEPHONE ENCOUNTER
Spoke with patient and reminded time to repeat his fasting lab. Patient verbalized understanding and will be completing CMP.

## 2024-05-11 LAB
ALBUMIN SERPL-MCNC: 4.3 G/DL (ref 3.6–5.1)
ALBUMIN/GLOB SERPL: 1.9 (CALC) (ref 1–2.5)
ALP SERPL-CCNC: 62 U/L (ref 35–144)
ALT SERPL-CCNC: 27 U/L (ref 9–46)
AST SERPL-CCNC: 23 U/L (ref 10–35)
BILIRUB SERPL-MCNC: 0.8 MG/DL (ref 0.2–1.2)
BUN SERPL-MCNC: 19 MG/DL (ref 7–25)
BUN/CREAT SERPL: NORMAL (CALC) (ref 6–22)
CALCIUM SERPL-MCNC: 9.4 MG/DL (ref 8.6–10.3)
CHLORIDE SERPL-SCNC: 104 MMOL/L (ref 98–110)
CO2 SERPL-SCNC: 30 MMOL/L (ref 20–32)
CREAT SERPL-MCNC: 1.11 MG/DL (ref 0.7–1.3)
EGFR: 80 ML/MIN/1.73M2
GLOBULIN SER CALC-MCNC: 2.3 G/DL (CALC) (ref 1.9–3.7)
GLUCOSE SERPL-MCNC: 95 MG/DL (ref 65–99)
POTASSIUM SERPL-SCNC: 4.8 MMOL/L (ref 3.5–5.3)
PROT SERPL-MCNC: 6.6 G/DL (ref 6.1–8.1)
SODIUM SERPL-SCNC: 139 MMOL/L (ref 135–146)

## 2024-05-13 ENCOUNTER — TELEPHONE (OUTPATIENT)
Dept: FAMILY MEDICINE | Facility: CLINIC | Age: 54
End: 2024-05-13
Payer: COMMERCIAL

## 2024-05-13 NOTE — PROGRESS NOTES
Call patient.  His liver enzymes are completely back to normal.  Sugar and kidneys also look great.  Continue as is

## 2024-05-13 NOTE — TELEPHONE ENCOUNTER
----- Message from Yefri Vega MD sent at 5/12/2024  8:27 PM CDT -----  Call patient.  His liver enzymes are completely back to normal.  Sugar and kidneys also look great.  Continue as is

## 2024-09-25 ENCOUNTER — TELEPHONE (OUTPATIENT)
Dept: FAMILY MEDICINE | Facility: CLINIC | Age: 54
End: 2024-09-25
Payer: COMMERCIAL

## 2024-09-25 DIAGNOSIS — Z12.5 SPECIAL SCREENING FOR MALIGNANT NEOPLASM OF PROSTATE: ICD-10-CM

## 2024-09-25 DIAGNOSIS — Z00.00 WELLNESS EXAMINATION: Primary | ICD-10-CM

## 2024-09-25 NOTE — TELEPHONE ENCOUNTER
Spoke with patient and let him know the labs cannot be done until after 10/12 due to his PSA. I let him know the blood glucose is included in the CMP, he refused. States it has to be ordered separately, states his insurance will not pay for it any other way. I let him know we would have to get back with him, I'm not sure how to order a blood glucose venipuncture alone.

## 2024-09-25 NOTE — TELEPHONE ENCOUNTER
I do remember this patient from an issue he had last year with insurance and some sort of health screening through his work, I believe. I pended order for fasting gluose. This is what Dr Vega ordered last year so I'm pretty sure it's the right order.

## 2024-09-25 NOTE — TELEPHONE ENCOUNTER
----- Message from Babita Bills sent at 9/25/2024  2:52 PM CDT -----  Pt needs to get his labs done before his next appt. Pt has to have a fasting blood glucose and lipid panel for insurance for his wellness visit. These labs have to be individual tests. Blood glucose has to be a venipuncture test. Please advise. Pt #[phone]

## 2024-10-07 ENCOUNTER — TELEPHONE (OUTPATIENT)
Dept: FAMILY MEDICINE | Facility: CLINIC | Age: 54
End: 2024-10-07
Payer: COMMERCIAL

## 2024-10-07 NOTE — TELEPHONE ENCOUNTER
----- Message from Colette sent at 10/7/2024 10:48 AM CDT -----  Vm- 10:36-pt would like to know if he needs labs done before his appt   [phone]

## 2024-10-07 NOTE — TELEPHONE ENCOUNTER
----- Message from Med Assistant Lilly sent at 10/7/2024  2:12 PM CDT -----  Pt is calling stating his insurance only wants him to get lipid and glucose labs done nothing else for his wellness visit coming up     Pt # [phone]

## 2024-10-07 NOTE — TELEPHONE ENCOUNTER
Yes, for this particular patient it sounds right. We went back and forth with his insurance last year regarding this same issue. I wouldn't push him getting further labs because of what happened previously. Dr Vega can discuss the importance of rest of annual labs with him and the patient can decide how he wants to proceed.

## 2024-10-23 ENCOUNTER — OFFICE VISIT (OUTPATIENT)
Dept: FAMILY MEDICINE | Facility: CLINIC | Age: 54
End: 2024-10-23
Attending: FAMILY MEDICINE
Payer: COMMERCIAL

## 2024-10-23 ENCOUNTER — TELEPHONE (OUTPATIENT)
Dept: FAMILY MEDICINE | Facility: CLINIC | Age: 54
End: 2024-10-23

## 2024-10-23 VITALS
DIASTOLIC BLOOD PRESSURE: 68 MMHG | WEIGHT: 173 LBS | HEART RATE: 63 BPM | BODY MASS INDEX: 25.62 KG/M2 | OXYGEN SATURATION: 98 % | SYSTOLIC BLOOD PRESSURE: 98 MMHG | HEIGHT: 69 IN

## 2024-10-23 DIAGNOSIS — D03.59 MELANOMA IN SITU OF TORSO EXCLUDING BREAST: ICD-10-CM

## 2024-10-23 DIAGNOSIS — Z00.00 WELLNESS EXAMINATION: Primary | ICD-10-CM

## 2024-10-23 DIAGNOSIS — Z85.828 HISTORY OF BASAL CELL CARCINOMA: ICD-10-CM

## 2024-10-23 PROCEDURE — 3008F BODY MASS INDEX DOCD: CPT | Mod: CPTII,S$GLB,, | Performed by: FAMILY MEDICINE

## 2024-10-23 PROCEDURE — 99396 PREV VISIT EST AGE 40-64: CPT | Mod: S$GLB,,, | Performed by: FAMILY MEDICINE

## 2024-10-23 PROCEDURE — 1159F MED LIST DOCD IN RCRD: CPT | Mod: CPTII,S$GLB,, | Performed by: FAMILY MEDICINE

## 2024-10-23 PROCEDURE — 3074F SYST BP LT 130 MM HG: CPT | Mod: CPTII,S$GLB,, | Performed by: FAMILY MEDICINE

## 2024-10-23 PROCEDURE — 3078F DIAST BP <80 MM HG: CPT | Mod: CPTII,S$GLB,, | Performed by: FAMILY MEDICINE

## 2024-10-23 RX ORDER — TAZAROTENE 0.45 MG/G
1 LOTION TOPICAL
COMMUNITY
Start: 2024-08-26

## 2024-10-23 NOTE — TELEPHONE ENCOUNTER
Per [facility], the glucose order we entered is plasma on their end and they don't collect that here. Advised her that we just need serum glucose fasting. She switched order accordingly.

## 2024-10-24 LAB
CHOLEST SERPL-MCNC: 157 MG/DL
CHOLEST/HDLC SERPL: 3.7 (CALC)
GLUCOSE SERPL-MCNC: 96 MG/DL (ref 65–99)
HDLC SERPL-MCNC: 43 MG/DL
LDLC SERPL CALC-MCNC: 99 MG/DL (CALC)
NONHDLC SERPL-MCNC: 114 MG/DL (CALC)
TRIGL SERPL-MCNC: 63 MG/DL

## 2024-10-28 ENCOUNTER — TELEPHONE (OUTPATIENT)
Dept: FAMILY MEDICINE | Facility: CLINIC | Age: 54
End: 2024-10-28
Payer: COMMERCIAL

## 2024-11-11 ENCOUNTER — TELEPHONE (OUTPATIENT)
Dept: FAMILY MEDICINE | Facility: CLINIC | Age: 54
End: 2024-11-11
Payer: COMMERCIAL

## 2024-11-11 NOTE — TELEPHONE ENCOUNTER
----- Message from Crystal sent at 11/11/2024  3:18 PM CST -----  Pt would a copy of his recent lap results. Pt would like to know if it can be emailed to him instead.    [phone]

## 2025-03-07 ENCOUNTER — TELEPHONE (OUTPATIENT)
Dept: FAMILY MEDICINE | Facility: CLINIC | Age: 55
End: 2025-03-07
Payer: COMMERCIAL

## 2025-03-07 NOTE — TELEPHONE ENCOUNTER
Patient states he hasn't needed a TB skin test in years because he has worked from home. He just had to get one done and it was positive. He thinks he was exposed as a child, he always tests positive. He thinks needs a chest xray or a IGRA blood test to be able to go back to work. He's going to call his work now and find out which one they prefer and will call back.

## 2025-08-04 ENCOUNTER — TELEPHONE (OUTPATIENT)
Dept: FAMILY MEDICINE | Facility: CLINIC | Age: 55
End: 2025-08-04
Payer: COMMERCIAL

## 2025-08-04 DIAGNOSIS — Z00.00 WELLNESS EXAMINATION: Primary | ICD-10-CM

## 2025-08-04 NOTE — TELEPHONE ENCOUNTER
----- Message from Gabby sent at 8/4/2025 12:33 PM CDT -----  Patient calling in regarding to needing a lipid panel and fasting blood glucose that need to be  for patient. Patient states these testing can not be together.   [phone]

## 2025-08-04 NOTE — TELEPHONE ENCOUNTER
He has an appt with Luis 8/22. I remember that he had an issue with insurance and billing a couple of years ago. States that he did not have an issue when labs were ordered in October 2024. I ordered the same way again and confirmed diagnosis with patient to be wellness exam only. Sent separately per pt request. Advised patient to let the lab know that he will need both orders when he comes in for blood work. Said that he will discuss other annual labs at office visit with Luis as these labs can't be drawn on the same day as glucose/lipid to be safe for insurance purposes.

## 2025-08-22 ENCOUNTER — OFFICE VISIT (OUTPATIENT)
Dept: FAMILY MEDICINE | Facility: CLINIC | Age: 55
End: 2025-08-22
Payer: COMMERCIAL

## 2025-08-22 VITALS
RESPIRATION RATE: 18 BRPM | SYSTOLIC BLOOD PRESSURE: 112 MMHG | DIASTOLIC BLOOD PRESSURE: 60 MMHG | BODY MASS INDEX: 26.83 KG/M2 | OXYGEN SATURATION: 97 % | HEIGHT: 68 IN | HEART RATE: 60 BPM | WEIGHT: 177 LBS

## 2025-08-22 DIAGNOSIS — Z00.00 WELLNESS EXAMINATION: Primary | ICD-10-CM

## 2025-08-22 DIAGNOSIS — Z85.828 HISTORY OF BASAL CELL CARCINOMA: ICD-10-CM

## 2025-08-22 DIAGNOSIS — D03.59 MELANOMA IN SITU OF TORSO EXCLUDING BREAST: ICD-10-CM
